# Patient Record
Sex: FEMALE | Race: WHITE | Employment: UNEMPLOYED | URBAN - METROPOLITAN AREA
[De-identification: names, ages, dates, MRNs, and addresses within clinical notes are randomized per-mention and may not be internally consistent; named-entity substitution may affect disease eponyms.]

---

## 2017-05-20 ENCOUNTER — HOSPITAL ENCOUNTER (EMERGENCY)
Age: 37
Discharge: HOME | End: 2017-05-20
Attending: INTERNAL MEDICINE | Admitting: INTERNAL MEDICINE
Payer: SUBSIDIZED

## 2017-05-20 ENCOUNTER — APPOINTMENT (OUTPATIENT)
Dept: GENERAL RADIOLOGY | Age: 37
End: 2017-05-20
Attending: INTERNAL MEDICINE
Payer: SUBSIDIZED

## 2017-05-20 VITALS
SYSTOLIC BLOOD PRESSURE: 141 MMHG | RESPIRATION RATE: 18 BRPM | HEART RATE: 93 BPM | BODY MASS INDEX: 29.03 KG/M2 | TEMPERATURE: 98.2 F | HEIGHT: 67 IN | DIASTOLIC BLOOD PRESSURE: 91 MMHG | OXYGEN SATURATION: 100 % | WEIGHT: 185 LBS

## 2017-05-20 DIAGNOSIS — S80.212A ABRASION, KNEE, LEFT, INITIAL ENCOUNTER: ICD-10-CM

## 2017-05-20 DIAGNOSIS — S92.354A CLOSED NONDISPLACED FRACTURE OF FIFTH METATARSAL BONE OF RIGHT FOOT, INITIAL ENCOUNTER: Primary | ICD-10-CM

## 2017-05-20 PROCEDURE — 74011250637 HC RX REV CODE- 250/637: Performed by: INTERNAL MEDICINE

## 2017-05-20 PROCEDURE — 73610 X-RAY EXAM OF ANKLE: CPT

## 2017-05-20 PROCEDURE — 75810000053 HC SPLINT APPLICATION

## 2017-05-20 PROCEDURE — 99283 EMERGENCY DEPT VISIT LOW MDM: CPT

## 2017-05-20 PROCEDURE — 73630 X-RAY EXAM OF FOOT: CPT

## 2017-05-20 PROCEDURE — 73564 X-RAY EXAM KNEE 4 OR MORE: CPT

## 2017-05-20 RX ORDER — ZOLPIDEM TARTRATE 10 MG/1
TABLET ORAL
COMMUNITY

## 2017-05-20 RX ORDER — SERTRALINE HYDROCHLORIDE 100 MG/1
TABLET, FILM COATED ORAL DAILY
COMMUNITY

## 2017-05-20 RX ORDER — HYDROCODONE BITARTRATE AND ACETAMINOPHEN 5; 325 MG/1; MG/1
1 TABLET ORAL
Qty: 20 TAB | Refills: 0 | Status: SHIPPED | OUTPATIENT
Start: 2017-05-20

## 2017-05-20 RX ORDER — HYDROCODONE BITARTRATE AND ACETAMINOPHEN 5; 325 MG/1; MG/1
2 TABLET ORAL ONCE
Status: COMPLETED | OUTPATIENT
Start: 2017-05-20 | End: 2017-05-20

## 2017-05-20 RX ADMIN — HYDROCODONE BITARTRATE AND ACETAMINOPHEN 2 TABLET: 5; 325 TABLET ORAL at 17:26

## 2017-05-20 NOTE — ED PROVIDER NOTES
HPI Comments: 5:06 PM Oneida Dunne is a 39 y.o. female with a history of ADD who presents to the emergency department c/o R foot pain onset last night. The patient explains she was rushing outside to check on her child, and missed the last step and fell. Pt also c/o pain and abrasion of her L knee pain. Pt denies LOC. LMP was 4/22. No other concerns at this time. Denies any other injury. PCP: No primary care provider on file. The history is provided by the patient. Past Medical History:   Diagnosis Date    ADD (attention deficit disorder)     GERD (gastroesophageal reflux disease)        History reviewed. No pertinent surgical history. History reviewed. No pertinent family history. Social History     Social History    Marital status:      Spouse name: N/A    Number of children: N/A    Years of education: N/A     Occupational History    Not on file. Social History Main Topics    Smoking status: Light Tobacco Smoker    Smokeless tobacco: Not on file    Alcohol use Yes    Drug use: No    Sexual activity: Not on file     Other Topics Concern    Not on file     Social History Narrative    No narrative on file         ALLERGIES: Penicillins    Review of Systems   Constitutional: Negative for chills, fatigue, fever and unexpected weight change. HENT: Negative for congestion and rhinorrhea. Respiratory: Negative for chest tightness and shortness of breath. Cardiovascular: Negative for chest pain, palpitations and leg swelling. Gastrointestinal: Negative for abdominal pain, nausea and vomiting. Genitourinary: Negative for dysuria. Musculoskeletal: Positive for arthralgias (R foot and L knee). Negative for back pain. Pain and swelling of the right dorsal foot   Skin: Negative for rash. Abrasion left knee   Neurological: Negative for dizziness and weakness. Psychiatric/Behavioral: The patient is not nervous/anxious.     All other systems reviewed and are negative. Vitals:    05/20/17 1704   BP: (!) 141/91   Pulse: 93   Resp: 18   Temp: 98.2 °F (36.8 °C)   SpO2: 100%   Weight: 83.9 kg (185 lb)   Height: 5' 6.5\" (1.689 m)            Physical Exam   Constitutional: She is oriented to person, place, and time. She appears well-developed and well-nourished. No distress. HENT:   Head: Normocephalic and atraumatic. Right Ear: External ear normal.   Left Ear: External ear normal.   Nose: Nose normal.   Mouth/Throat: Oropharynx is clear and moist.   Eyes: Conjunctivae and EOM are normal. Pupils are equal, round, and reactive to light. No scleral icterus. Neck: Normal range of motion. Neck supple. No JVD present. No tracheal deviation present. No thyromegaly present. Cardiovascular: Normal rate, regular rhythm, normal heart sounds and intact distal pulses. Exam reveals no gallop and no friction rub. No murmur heard. Pulmonary/Chest: Effort normal and breath sounds normal. She exhibits no tenderness. Abdominal: Soft. Bowel sounds are normal. She exhibits no distension. There is no tenderness. There is no rebound and no guarding. Musculoskeletal: Normal range of motion. She exhibits no edema or tenderness. No effusion MCL and PCL are stable. No knee instability. Left knee abrasion     Lymphadenopathy:     She has no cervical adenopathy. Neurological: She is alert and oriented to person, place, and time. No cranial nerve deficit. Coordination normal.   No sensory loss, Gait normal, Motor 5/5   Skin: Skin is warm and dry. Bruising noted. Mild brusing to lateral dorsal foot and inframalleolar area on R side. Small amount of bruising and ecchymosis on medial malleolar area. Abrasion to L knee. Psychiatric: She has a normal mood and affect. Her behavior is normal. Judgment and thought content normal.   Nursing note and vitals reviewed.        [facility]  ED Course       Procedures      Vitals:  Patient Vitals for the past 12 hrs:   Temp Pulse Resp BP SpO2   05/20/17 1704 98.2 °F (36.8 °C) 93 18 (!) 141/91 100 %         Medications ordered:   Medications   HYDROcodone-acetaminophen (NORCO) 5-325 mg per tablet 2 Tab (2 Tabs Oral Given 5/20/17 1726)         Lab findings:  No results found for this or any previous visit (from the past 12 hour(s)). X-Ray, CT or other radiology findings or impressions:  XR KNEE LT MIN 4 V    (Results )  No acute process. XR FOOT RT MIN 3 V    (Results )  Fracture of proximal 5th metatarsal.    XR ANKLE RT MIN 3 V    (Results )  No acute process. Orders:  Orders Placed This Encounter    APPLY SPLINT: SPECIFY     Post splint right leg     Standing Status:   Standing     Number of Occurrences:   1    XR KNEE LT MIN 4 V     Standing Status:   Standing     Number of Occurrences:   1     Order Specific Question:   Transport     Answer:   Stretcher [5]     Order Specific Question:   Reason for Exam     Answer:   fall yesterday    XR FOOT RT MIN 3 V     Standing Status:   Standing     Number of Occurrences:   1     Order Specific Question:   Transport     Answer:   Stretcher [5]     Order Specific Question:   Reason for Exam     Answer:   pain    XR ANKLE RT MIN 3 V     Standing Status:   Standing     Number of Occurrences:   1     Order Specific Question:   Transport     Answer:   Stretcher [5]     Order Specific Question:   Reason for Exam     Answer:   pain p fall     Order Specific Question:   Is Patient Pregnant? Answer:   Unknown    CRUTCHES WITH INSTRUCTIONS     Standing Status:   Standing     Number of Occurrences:   1    zolpidem (AMBIEN) 10 mg tablet     Sig: Take  by mouth nightly as needed for Sleep.  sertraline (ZOLOFT) 100 mg tablet     Sig: Take  by mouth daily.  HYDROcodone-acetaminophen (NORCO) 5-325 mg per tablet 2 Tab    HYDROcodone-acetaminophen (NORCO) 5-325 mg per tablet     Sig: Take 1 Tab by mouth every four (4) hours as needed for Pain. Max Daily Amount: 6 Tabs.      Dispense: 20 Tab     Refill:  0     CONSULT     Standing Status:   Standing     Number of Occurrences:   1     Order Specific Question:   Reason for Consult: Answer:   insurance needs       Progress notes, Consult notes, or additional Procedure notes:   Post Splint with good toe movement; sensation; cap refill    Disposition:  Diagnosis:   1. Closed nondisplaced fracture of fifth metatarsal bone of right foot, initial encounter    2. Abrasion, knee, left, initial encounter        Disposition: Discharged    Follow-up Information     Follow up With Details Comments [address], [place] Schedule an appointment as soon as possible for a visit ED visit follow up [phone] [facility] [address]  [phone]      [facility] EMERGENCY DEPT Go to As needed, If symptoms worsen [address]  [phone]           Discharge Medication List as of 5/20/2017  6:01 PM      START taking these medications    Details   HYDROcodone-acetaminophen (NORCO) 5-325 mg per tablet Take 1 Tab by mouth every four (4) hours as needed for Pain. Max Daily Amount: 6 Tabs., Print, Disp-20 Tab, R-0         CONTINUE these medications which have NOT CHANGED    Details   zolpidem (AMBIEN) 10 mg tablet Take  by mouth nightly as needed for Sleep., Historical Med      sertraline (ZOLOFT) 100 mg tablet Take  by mouth daily. , Historical Med                   Scribe Attestation  [facility] for and in presence of Milagro Silverio MD (05/20/17)      Physician Attestation  I personally preformed the services described in this documentation, reviewed and edited the documentation which was dictated to the scribe in my presence, and it accurately records my words and actions.      Milagro Silverio MD (05/20/17)      Signed by: Fior Marcum, 05/20/17, 5:06 PM

## 2017-05-20 NOTE — ED TRIAGE NOTES
Patient fell going down the stairs last night, no LOC, patient with right ankle pain and left knee pain and abrasion

## 2017-05-20 NOTE — DISCHARGE INSTRUCTIONS
Metatarsal Fracture: Care Instructions  Your Care Instructions    A metatarsal fracture is a thin, hairline crack to the fifth metatarsal bone of the foot. The fifth metatarsal is the long bone on the outside of the foot. This type of fracture usually happens from repeated stress on the bones of the foot. Or it can occur when a person jumps or changes direction quickly and twists his or her foot or ankle the wrong way. 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 Treatment depends on how bad the fracture is and where the fracture is on the bone. You may or may not have had surgery. Your doctor may have put your foot in a cast or splint to keep it stable and given you crutches to use to keep weight off your foot. A metatarsal fracture may take from 6 weeks to several months to heal. It is important to give your foot time to heal completely, so that you do not hurt it again. Do not return to your usual activities until your doctor says you can. Your doctor may suggest that you get physical therapy to help regain strength and range of motion in your foot. You heal best when you take good care of yourself. Eat a variety of healthy foods, and don't smoke. Follow-up care is a key part of your treatment and safety. Be sure to make and go to all appointments, and call your doctor if you are having problems. It is also a good idea to know your test results and keep a list of the medicines you take. How can you care for yourself at home? · Be safe with medicines. Take pain medicines exactly as directed. ¨ If your doctor gave you a prescription medicine for pain, take it as prescribed. ¨ If you are not taking a prescription pain medicine, ask your doctor if you can take an over-the-counter medicine. ¨ Do not take two or more pain medicines at the same time unless your doctor told you to.  Many pain medicines have acetaminophen, which is Tylenol. Too much acetaminophen (Tylenol) can be harmful. · Follow your doctor's instructions about how much weight you can put on your foot and when you can go back to your usual activities. If you were given crutches, use them as directed. · If your doctor suggests it, put ice or a cold pack on your foot for 10 to 15 minutes at a time. Try to do this every 1 to 2 hours for the next 3 days (when you are awake) or until the swelling goes down. Put a thin cloth between the ice and your skin. Keep your cast or splint dry. · Prop up your foot on a pillow when you ice it or anytime you sit or lie down for the next 3 days. Try to keep it above the level of your heart. This will help reduce swelling. · If your foot is in a cast or splint, follow the cast or splint care instructions your doctor gives you. If you have a removable fiberglass walking cast or a splint, do not take it off unless your doctor tells you to. Cast and splint care  · If you have a removable fiberglass walking cast or a splint, ask your doctor if it is okay to remove it to bathe. Your doctor may want you to keep it on as much as possible. · If you're told to keep your cast or splint on, tape a sheet of plastic to cover it when you bathe. Water under the cast or splint can cause your skin to itch and hurt. · Never cut your cast or stick anything down it to scratch an itch on your leg. When should you call for help? Call your doctor now or seek immediate medical care if:  · You have increased or severe pain. · Your foot is cool, pale, or changes color. · You have tingling, weakness, or numbness in your foot and toes. · Your cast or splint feels too tight. · You cannot move your toes. · You have a lot of swelling below your cast or splint. Watch closely for changes in your health, and be sure to contact your doctor if:  · The pain does not get better day by day. · The skin under your cast or splint burns or stings.   · You do not get better as expected. Where can you learn more? Go to http://racquel-renetta.info/. Enter ([phone] in the search box to learn more about \"Metatarsal Fracture: Care Instructions. \"  Current as of: May 27, 2016  Content Version: 11.2  © 0140-9949 [employer]. Care instructions adapted under license by [email or web address] (which disclaims liability or warranty for this information). If you have questions about a medical condition or this instruction, always ask your healthcare professional. [address] any warranty or liability for your use of this information.